# Patient Record
Sex: FEMALE | Race: WHITE
[De-identification: names, ages, dates, MRNs, and addresses within clinical notes are randomized per-mention and may not be internally consistent; named-entity substitution may affect disease eponyms.]

---

## 2017-06-10 ENCOUNTER — HEALTH MAINTENANCE LETTER (OUTPATIENT)
Age: 52
End: 2017-06-10

## 2017-07-19 ENCOUNTER — TRANSFERRED RECORDS (OUTPATIENT)
Dept: HEALTH INFORMATION MANAGEMENT | Facility: CLINIC | Age: 52
End: 2017-07-19

## 2017-08-10 ENCOUNTER — TRANSFERRED RECORDS (OUTPATIENT)
Dept: HEALTH INFORMATION MANAGEMENT | Facility: CLINIC | Age: 52
End: 2017-08-10

## 2017-08-11 ENCOUNTER — TRANSFERRED RECORDS (OUTPATIENT)
Dept: HEALTH INFORMATION MANAGEMENT | Facility: CLINIC | Age: 52
End: 2017-08-11

## 2017-08-11 PROBLEM — C56.1 MALIGNANT NEOPLASM OF RIGHT OVARY (H): Status: ACTIVE | Noted: 2017-08-11

## 2017-08-12 ENCOUNTER — HOSPITAL ENCOUNTER (OUTPATIENT)
Facility: CLINIC | Age: 52
Setting detail: SPECIMEN
Discharge: HOME | End: 2017-08-12
Attending: INTERNAL MEDICINE | Admitting: INTERNAL MEDICINE
Payer: COMMERCIAL

## 2017-08-12 ENCOUNTER — INFUSION THERAPY VISIT (OUTPATIENT)
Dept: INFUSION THERAPY | Facility: CLINIC | Age: 52
End: 2017-08-12
Attending: INTERNAL MEDICINE
Payer: COMMERCIAL

## 2017-08-12 DIAGNOSIS — C56.1 MALIGNANT NEOPLASM OF RIGHT OVARY (H): Primary | ICD-10-CM

## 2017-08-12 LAB
ABO + RH BLD: NORMAL
ABO + RH BLD: NORMAL
BLD GP AB SCN SERPL QL: NORMAL
BLD PROD TYP BPU: NORMAL
BLOOD BANK CMNT PATIENT-IMP: NORMAL
NUM BPU REQUESTED: 1
SPECIMEN EXP DATE BLD: NORMAL

## 2017-08-12 PROCEDURE — 86850 RBC ANTIBODY SCREEN: CPT | Performed by: INTERNAL MEDICINE

## 2017-08-12 PROCEDURE — 86923 COMPATIBILITY TEST ELECTRIC: CPT | Performed by: INTERNAL MEDICINE

## 2017-08-12 PROCEDURE — 86901 BLOOD TYPING SEROLOGIC RH(D): CPT | Performed by: INTERNAL MEDICINE

## 2017-08-12 PROCEDURE — 86900 BLOOD TYPING SEROLOGIC ABO: CPT | Performed by: INTERNAL MEDICINE

## 2017-08-12 PROCEDURE — 36415 COLL VENOUS BLD VENIPUNCTURE: CPT

## 2017-08-12 NOTE — PROGRESS NOTES
Infusion Nursing Note:  Eun Joel presents today for peripheral lab draw  Patient seen by provider today: No   present during visit today: Not Applicable.    Note: will return tomorrow for transfusion.    Intravenous Access:  Lab draw site f/a, Needle type butterfly, Gauge 23.    Treatment Conditions:  Not Applicable.      Post Infusion Assessment:  Site patent and intact, free from redness, edema or discomfort.    Discharge Plan:   Patient discharged in stable condition accompanied by: self.    Melody Martin RN

## 2017-08-12 NOTE — MR AVS SNAPSHOT
After Visit Summary   8/12/2017    Eun Joel    MRN: [id number]           Patient Information     Date Of Birth          1965        Visit Information        Provider Department      8/12/2017 11:00 AM  INFUSION CHAIR 16 [facility]        Today's Diagnoses     Malignant neoplasm of right ovary (H)    -  1       Follow-ups after your visit        Your next 10 appointments already scheduled     Aug 13, 2017  7:30 AM CDT   Level 3 with  INFUSION CHAIR 14   [facility] ([facility])    [facility]  [address]  [facility] [id number] [phone]              Who to contact     If you have questions or need follow up information about today's clinic visit or your schedule please contact [facility] directly at [phone].  Normal or non-critical lab and imaging results will be communicated to you by MyChart, letter or phone within 4 business days after the clinic has received the results. If you do not hear from us within 7 days, please contact the clinic through Musiwavehart or phone. If you have a critical or abnormal lab result, we will notify you by phone as soon as possible.  Submit refill requests through [employer] or call your pharmacy and they will forward the refill request to us. Please allow 3 business days for your refill to be completed.          Additional Information About Your Visit        MyChart Information     [employer] gives you secure access to your electronic health record. If you see a primary care provider, you can also send messages to your care team and make appointments. If you have questions, please call your primary care clinic.  If you do not have a primary care provider, please call [phone] and they will assist you.        Care EveryWhere ID     This is your Care EveryWhere ID. This could be used by other organizations  to access your [place] medical records  [id number]         Blood Pressure from Last 3 Encounters:   05/21/09 110/74   04/02/09 110/72   03/13/08 106/74    Weight from Last 3 Encounters:   05/21/09 86.2 kg (190 lb)   04/02/09 84.4 kg (186 lb)   03/13/08 83 kg (183 lb)              We Performed the Following     ABO/Rh type and screen        Primary Care Provider    None Specified       No primary provider on file.        Equal Access to Services     JOHANA FREED : Hadii aad ku hadasho Soomaali, waaxda luqadaha, qaybta kaalmada adeegyada, waxay dianain deyaniran tavon wolfe . So [facility] [phone].    ATENCIÓN: Si habla español, tiene a gee disposición servicios gratuitos de asistencia lingüística. Llame al [phone].    We comply with applicable federal civil rights laws and [place] laws. We do not discriminate on the basis of race, color, national origin, age, disability sex, sexual orientation or gender identity.            Thank you!     Thank you for choosing [facility] AND [facility]  for your care. Our goal is always to provide you with excellent care. Hearing back from our patients is one way we can continue to improve our services. Please take a few minutes to complete the written survey that you may receive in the mail after your visit with us. Thank you!             Your Updated Medication List - Protect others around you: Learn how to safely use, store and throw away your medicines at www.disposemymeds.org.          This list is accurate as of: 8/12/17 11:12 AM.  Always use your most recent med list.                   Brand Name Dispense Instructions for use Diagnosis    clobetasol propionate 0.05 % Liqd     99 grams    spray lightly to rash BID for psoriasis    Other psoriasis       LEVOXYL 50 MCG tablet   Generic drug:  levothyroxine     90 Tab    ONE DAILY IN THE MORNING for hypothyroidism    Unspecified hypothyroidism

## 2017-08-13 ENCOUNTER — INFUSION THERAPY VISIT (OUTPATIENT)
Dept: INFUSION THERAPY | Facility: CLINIC | Age: 52
End: 2017-08-13
Attending: INTERNAL MEDICINE
Payer: COMMERCIAL

## 2017-08-13 VITALS
HEART RATE: 95 BPM | TEMPERATURE: 98.2 F | RESPIRATION RATE: 16 BRPM | OXYGEN SATURATION: 100 % | DIASTOLIC BLOOD PRESSURE: 79 MMHG | SYSTOLIC BLOOD PRESSURE: 119 MMHG

## 2017-08-13 DIAGNOSIS — C56.1 MALIGNANT NEOPLASM OF RIGHT OVARY (H): Primary | ICD-10-CM

## 2017-08-13 LAB
BLD PROD TYP BPU: NORMAL
BLD UNIT ID BPU: 0
BLOOD PRODUCT CODE: NORMAL
BPU ID: NORMAL
TRANSFUSION STATUS PATIENT QL: NORMAL
TRANSFUSION STATUS PATIENT QL: NORMAL

## 2017-08-13 PROCEDURE — 36430 TRANSFUSION BLD/BLD COMPNT: CPT

## 2017-08-13 PROCEDURE — P9016 RBC LEUKOCYTES REDUCED: HCPCS

## 2017-08-13 RX ORDER — HYDROCODONE BITARTRATE AND ACETAMINOPHEN 5; 325 MG/1; MG/1
1 TABLET ORAL EVERY 4 HOURS PRN
COMMUNITY

## 2017-08-13 ASSESSMENT — PAIN SCALES - GENERAL: PAINLEVEL: NO PAIN (0)

## 2017-08-13 NOTE — MR AVS SNAPSHOT
After Visit Summary   8/13/2017    Eun Joel    MRN: [id number]           Patient Information     Date Of Birth          1965        Visit Information        Provider Department      8/13/2017 7:30 AM  INFUSION CHAIR 14 [facility]        Today's Diagnoses     Malignant neoplasm of right ovary (H)    -  1       Follow-ups after your visit        Who to contact     If you have questions or need follow up information about today's clinic visit or your schedule please contact [facility] directly at [phone].  Normal or non-critical lab and imaging results will be communicated to you by Accertifyhart, letter or phone within 4 business days after the clinic has received the results. If you do not hear from us within 7 days, please contact the clinic through [employer] or phone. If you have a critical or abnormal lab result, we will notify you by phone as soon as possible.  Submit refill requests through [employer] or call your pharmacy and they will forward the refill request to us. Please allow 3 business days for your refill to be completed.          Additional Information About Your Visit        MyChart Information     [employer] gives you secure access to your electronic health record. If you see a primary care provider, you can also send messages to your care team and make appointments. If you have questions, please call your primary care clinic.  If you do not have a primary care provider, please call [phone] and they will assist you.        Care EveryWhere ID     This is your Care EveryWhere ID. This could be used by other organizations to access your [place] medical records  [id number]        Your Vitals Were     Pulse Temperature Respirations Pulse Oximetry          95 98.2  F (36.8  C) (Oral) 16 100%         Blood Pressure from Last 3 Encounters:   08/13/17 119/79   05/21/09 110/74   04/02/09 110/72    Weight from Last 3  Encounters:   05/21/09 86.2 kg (190 lb)   04/02/09 84.4 kg (186 lb)   03/13/08 83 kg (183 lb)              We Performed the Following     Transfuse red blood cell unit        Primary Care Provider    None Specified       No primary provider on file.        Equal Access to Services     RUSTY FREED : Hadii sina long clint Hernandez, waaxda luqadaha, qaybta kaalmada ademitchell, cecille jose deyaniramaninder montes de oca johnynes hernandez. So [facility] [phone].    ATENCIÓN: Si habla español, tiene a gee disposición servicios gratuitos de asistencia lingüística. Llame al [phone].    We comply with applicable federal civil rights laws and [place] laws. We do not discriminate on the basis of race, color, national origin, age, disability sex, sexual orientation or gender identity.            Thank you!     Thank you for choosing [facility] AND [facility]  for your care. Our goal is always to provide you with excellent care. Hearing back from our patients is one way we can continue to improve our services. Please take a few minutes to complete the written survey that you may receive in the mail after your visit with us. Thank you!             Your Updated Medication List - Protect others around you: Learn how to safely use, store and throw away your medicines at www.disposemymeds.org.          This list is accurate as of: 8/13/17  9:35 AM.  Always use your most recent med list.                   Brand Name Dispense Instructions for use Diagnosis    ALEVE PO      Take 220 mg by mouth 2 times daily as needed for moderate pain        clobetasol propionate 0.05 % Liqd     99 grams    spray lightly to rash BID for psoriasis    Other psoriasis       COMPAZINE PO      Take 5 mg by mouth every 6 hours as needed for nausea        HYDROcodone-acetaminophen 5-325 MG per tablet    NORCO     Take 1 tablet by mouth every 4 hours as needed for moderate to severe pain        LEVOXYL 50 MCG tablet   Generic drug:  levothyroxine     90 Tab     ONE DAILY IN THE MORNING for hypothyroidism    Unspecified hypothyroidism       LORAZEPAM PO      Take 0.5 mg by mouth every 4 hours as needed for anxiety

## 2017-08-13 NOTE — PROGRESS NOTES
"Infusion Nursing Note:  Eun Joel presents today for blood transfusion  Patient seen by provider today: No   present during visit today: Not Applicable.    Note: [facility] pt here for 1 unit prbc's for hgb 7.2.    Intravenous Access:  Peripheral IV placed.    Treatment Conditions:  Blood transfusion consent signed 8/12/17.      Post Infusion Assessment:  Patient tolerated infusion without incident.  Blood return noted pre and post infusion.  Site patent and intact, free from redness, edema or discomfort.  No evidence of extravasations.  Access discontinued per protocol.    Discharge Plan:   Discharge instructions reviewed with: Patient.  What to expect post Blood Products reviewed with patient and \"After Your Blood Transfusion\" document given to patient.  Patient agreed to call [facility] with any concerns.  Patient and/or family verbalized understanding of discharge instructions and all questions answered.  AVS to patient via TuneStarsT. Patient discharged in stable condition accompanied by: self.  Departure Mode: Ambulatory.    Melody Martin RN  Sunita Cerda RN                          "

## 2019-09-30 ENCOUNTER — HEALTH MAINTENANCE LETTER (OUTPATIENT)
Age: 54
End: 2019-09-30

## 2021-01-15 ENCOUNTER — HEALTH MAINTENANCE LETTER (OUTPATIENT)
Age: 56
End: 2021-01-15

## 2021-01-23 ENCOUNTER — HEALTH MAINTENANCE LETTER (OUTPATIENT)
Age: 56
End: 2021-01-23

## 2021-10-24 ENCOUNTER — HEALTH MAINTENANCE LETTER (OUTPATIENT)
Age: 56
End: 2021-10-24

## 2022-02-13 ENCOUNTER — HEALTH MAINTENANCE LETTER (OUTPATIENT)
Age: 57
End: 2022-02-13

## 2022-10-16 ENCOUNTER — HEALTH MAINTENANCE LETTER (OUTPATIENT)
Age: 57
End: 2022-10-16

## 2023-03-26 ENCOUNTER — HEALTH MAINTENANCE LETTER (OUTPATIENT)
Age: 58
End: 2023-03-26